# Patient Record
Sex: MALE | Race: WHITE | Employment: FULL TIME | URBAN - NONMETROPOLITAN AREA
[De-identification: names, ages, dates, MRNs, and addresses within clinical notes are randomized per-mention and may not be internally consistent; named-entity substitution may affect disease eponyms.]

---

## 2018-06-29 ENCOUNTER — OFFICE VISIT (OUTPATIENT)
Dept: URGENT CARE | Age: 33
End: 2018-06-29
Payer: COMMERCIAL

## 2018-06-29 ENCOUNTER — HOSPITAL ENCOUNTER (OUTPATIENT)
Dept: GENERAL RADIOLOGY | Age: 33
Discharge: HOME | End: 2018-06-29
Payer: COMMERCIAL

## 2018-06-29 VITALS
HEART RATE: 77 BPM | TEMPERATURE: 98.2 F | OXYGEN SATURATION: 98 % | DIASTOLIC BLOOD PRESSURE: 89 MMHG | BODY MASS INDEX: 25.8 KG/M2 | WEIGHT: 180.2 LBS | SYSTOLIC BLOOD PRESSURE: 139 MMHG | HEIGHT: 70 IN | RESPIRATION RATE: 20 BRPM

## 2018-06-29 DIAGNOSIS — M25.521 RIGHT ELBOW PAIN: ICD-10-CM

## 2018-06-29 DIAGNOSIS — S59.911A INJURY OF RIGHT FOREARM, INITIAL ENCOUNTER: ICD-10-CM

## 2018-06-29 DIAGNOSIS — S46.911A: Primary | ICD-10-CM

## 2018-06-29 PROCEDURE — 99202 OFFICE O/P NEW SF 15 MIN: CPT | Performed by: NURSE PRACTITIONER

## 2018-06-29 PROCEDURE — 73070 X-RAY EXAM OF ELBOW: CPT

## 2018-06-29 PROCEDURE — 96372 THER/PROPH/DIAG INJ SC/IM: CPT | Performed by: NURSE PRACTITIONER

## 2018-06-29 RX ORDER — METHADONE HYDROCHLORIDE 40 MG/1
90 TABLET ORAL DAILY
COMMUNITY

## 2018-06-29 RX ORDER — KETOROLAC TROMETHAMINE 30 MG/ML
60 INJECTION, SOLUTION INTRAMUSCULAR; INTRAVENOUS ONCE
Status: COMPLETED | OUTPATIENT
Start: 2018-06-29 | End: 2018-06-29

## 2018-06-29 RX ORDER — NAPROXEN 500 MG/1
500 TABLET ORAL 2 TIMES DAILY WITH MEALS
Qty: 60 TABLET | Refills: 0 | Status: SHIPPED | OUTPATIENT
Start: 2018-06-29

## 2018-06-29 RX ADMIN — KETOROLAC TROMETHAMINE 60 MG: 30 INJECTION, SOLUTION INTRAMUSCULAR; INTRAVENOUS at 10:26

## 2018-07-02 ENCOUNTER — OFFICE VISIT (OUTPATIENT)
Dept: URGENT CARE | Age: 33
End: 2018-07-02
Payer: COMMERCIAL

## 2018-07-02 ENCOUNTER — TELEPHONE (OUTPATIENT)
Dept: URGENT CARE | Age: 33
End: 2018-07-02

## 2018-07-02 VITALS
TEMPERATURE: 98.4 F | RESPIRATION RATE: 20 BRPM | WEIGHT: 177.6 LBS | BODY MASS INDEX: 25.43 KG/M2 | SYSTOLIC BLOOD PRESSURE: 130 MMHG | HEART RATE: 72 BPM | OXYGEN SATURATION: 97 % | DIASTOLIC BLOOD PRESSURE: 68 MMHG | HEIGHT: 70 IN

## 2018-07-02 DIAGNOSIS — R58 ECCHYMOSIS OF FOREARM: ICD-10-CM

## 2018-07-02 DIAGNOSIS — M79.601 RIGHT ARM PAIN: Primary | ICD-10-CM

## 2018-07-02 PROCEDURE — 99213 OFFICE O/P EST LOW 20 MIN: CPT | Performed by: PHYSICIAN ASSISTANT

## 2018-07-02 NOTE — PATIENT INSTRUCTIONS
Patient Education        Arm Pain: Care Instructions  Your Care Instructions    You can hurt your arm by using it too much or by injuring it. Biking, wrestling, and home repair projects are examples of activities that can lead to arm pain. Everyday wear and tear, especially as you get older, can cause arm pain. Your forearms, wrists, hands, and fingers are the parts of your arm that are most likely to become painful. A minor arm injury usually will heal on its own with home treatment to relieve swelling and pain. If you have a more serious injury, you may need tests and treatment. Follow-up care is a key part of your treatment and safety. Be sure to make and go to all appointments, and call your doctor if you are having problems. It's also a good idea to know your test results and keep a list of the medicines you take. How can you care for yourself at home? · Take pain medicines exactly as directed. ¨ If the doctor gave you a prescription medicine for pain, take it as prescribed. ¨ If you are not taking a prescription pain medicine, ask your doctor if you can take an over-the-counter medicine. · Rest and protect your arm. Take a break from any activity that may cause pain. · Put ice or a cold pack on your arm for 10 to 20 minutes at a time. Put a thin cloth between the ice and your skin. · Prop up the sore arm on a pillow when you ice it or anytime you sit or lie down during the next 3 days. Try to keep it above the level of your heart. This will help reduce swelling. · If your doctor recommends a sling to support your arm, wear it as directed. When should you call for help? Call 911 anytime you think you may need emergency care. For example, call if:    · Your arm or hand is cool or pale or changes color.    Call your doctor now or seek immediate medical care if:    · You cannot use your arm.     · You have signs of infection, such as:  ¨ Increased pain, swelling, warmth, or redness.   ¨ Red streaks

## 2018-07-02 NOTE — PROGRESS NOTES
Subjective:      Patient ID: Reina Melgar is a 33 y.o. male. HPI    Ruddy Cloud returns today with right arm pain. Injured at work 3 days ago. Was seen here the day arm was injured. XR was ordered and was normal. Patient continues to have pain in affected arm along with bruising of his forearm. States that his elbow hurts worse today then it did the day of injury. Does not believe he can do his job due to requirement of frequent heavy lifting. Review of Systems   Constitutional: Negative for chills and fever. Musculoskeletal:        Right arm pain   All other systems reviewed and are negative. Objective:   Physical Exam   Constitutional: He is oriented to person, place, and time. He appears well-developed and well-nourished. No distress. HENT:   Head: Normocephalic and atraumatic. Eyes: Right eye exhibits no discharge. Left eye exhibits no discharge. Neck: Normal range of motion. Neck supple. Cardiovascular:   No murmur heard. Pulmonary/Chest: Effort normal. No respiratory distress. Lymphadenopathy:     He has no cervical adenopathy. Neurological: He is alert and oriented to person, place, and time. Skin: Skin is warm and dry. He is not diaphoretic. Ecchymosis of right forearm; tenderness of medial lateral epicondyle   Psychiatric: He has a normal mood and affect. His behavior is normal. Judgment and thought content normal.   Nursing note and vitals reviewed. Assessment:      Right Elbow Pain  Right Forearm Pain      Plan:      - MRI Right Upper Extremity including elbow. - Work excuse given for tomorrow. May return on Thursday. - If any abnormality noted on MRI, will refer to ortho for further evaluation and treatment.   - Notify clinic with any questions or concerns.   - Return as needed.

## 2018-07-05 ENCOUNTER — TELEPHONE (OUTPATIENT)
Dept: URGENT CARE | Age: 33
End: 2018-07-05

## 2018-07-05 NOTE — TELEPHONE ENCOUNTER
Patient called in to see if we had received MRI results. Said that he can't go back to work until he has a signed release, so he missed work today. Wanted to know if we could give him an excuse for today. Told him that I would speak with the provider and call him back. Spoke with provider, Amada Romero, who looked at the MRI results and said that they were normal. Said that the patient could have an excuse for today and be released to go back to work tomorrow. Called patient, gave him results.  Said that he would be by today to  the work excuse and get his release signed.--HC, LPN

## 2018-07-09 ENCOUNTER — TELEPHONE (OUTPATIENT)
Dept: URGENT CARE | Age: 33
End: 2018-07-09